# Patient Record
Sex: MALE | Race: WHITE
[De-identification: names, ages, dates, MRNs, and addresses within clinical notes are randomized per-mention and may not be internally consistent; named-entity substitution may affect disease eponyms.]

---

## 2019-11-29 ENCOUNTER — HOSPITAL ENCOUNTER (EMERGENCY)
Dept: HOSPITAL 74 - JER | Age: 59
LOS: 1 days | Discharge: HOME | End: 2019-11-30
Payer: COMMERCIAL

## 2019-11-29 VITALS — BODY MASS INDEX: 26.6 KG/M2

## 2019-11-29 VITALS — TEMPERATURE: 98.2 F

## 2019-11-29 DIAGNOSIS — Z79.4: ICD-10-CM

## 2019-11-29 DIAGNOSIS — E10.9: ICD-10-CM

## 2019-11-29 DIAGNOSIS — R51: Primary | ICD-10-CM

## 2019-11-29 LAB
ALBUMIN SERPL-MCNC: 4.1 G/DL (ref 3.4–5)
ALP SERPL-CCNC: 102 U/L (ref 45–117)
ALT SERPL-CCNC: 25 U/L (ref 13–61)
ANION GAP SERPL CALC-SCNC: 9 MMOL/L (ref 8–16)
AST SERPL-CCNC: 12 U/L (ref 15–37)
BILIRUB SERPL-MCNC: 0.4 MG/DL (ref 0.2–1)
BUN SERPL-MCNC: 12 MG/DL (ref 7–18)
CALCIUM SERPL-MCNC: 9.6 MG/DL (ref 8.5–10.1)
CHLORIDE SERPL-SCNC: 103 MMOL/L (ref 98–107)
CO2 SERPL-SCNC: 28 MMOL/L (ref 21–32)
CREAT SERPL-MCNC: 0.9 MG/DL (ref 0.55–1.3)
DEPRECATED RDW RBC AUTO: 15 % (ref 11.9–15.9)
GLUCOSE SERPL-MCNC: 273 MG/DL (ref 74–106)
HCT VFR BLD CALC: 38 % (ref 35.4–49)
HGB BLD-MCNC: 12.8 GM/DL (ref 11.7–16.9)
INR BLD: 0.97 (ref 0.83–1.09)
MCH RBC QN AUTO: 28.1 PG (ref 25.7–33.7)
MCHC RBC AUTO-ENTMCNC: 33.7 G/DL (ref 32–35.9)
MCV RBC: 83.2 FL (ref 80–96)
PLATELET # BLD AUTO: 264 K/MM3 (ref 134–434)
PMV BLD: 8.3 FL (ref 7.5–11.1)
POTASSIUM SERPLBLD-SCNC: 4.3 MMOL/L (ref 3.5–5.1)
PROT SERPL-MCNC: 7.5 G/DL (ref 6.4–8.2)
PT PNL PPP: 11.4 SEC (ref 9.7–13)
RBC # BLD AUTO: 4.57 M/MM3 (ref 4–5.6)
SODIUM SERPL-SCNC: 140 MMOL/L (ref 136–145)
WBC # BLD AUTO: 11 K/MM3 (ref 4–10)

## 2019-11-29 PROCEDURE — 3E033NZ INTRODUCTION OF ANALGESICS, HYPNOTICS, SEDATIVES INTO PERIPHERAL VEIN, PERCUTANEOUS APPROACH: ICD-10-PCS | Performed by: EMERGENCY MEDICINE

## 2019-11-29 PROCEDURE — 3E033GC INTRODUCTION OF OTHER THERAPEUTIC SUBSTANCE INTO PERIPHERAL VEIN, PERCUTANEOUS APPROACH: ICD-10-PCS | Performed by: EMERGENCY MEDICINE

## 2019-11-29 NOTE — PDOC
History of Present Illness





<Alison Slade - Last Filed: 11/29/19 22:59>





- General


History Source: Patient


Exam Limitations: No Limitations





- History of Present Illness


Initial Comments: 





11/29/19 22:16


60 yo male pmh DM presents to the ED with 1 day of HA and resolved non 

exertional CP. Pt states the HA started early this morning, described as 

constant and pounding without radiation. Pt tried advil without any relief. Pt 

admits to hx or migraines years ago and states it is of the same quality and 

intensity as in the past. CP resolved prior to evaluation, described as non 

exertional, located on the left side, denies radiation, denies fhx heart disease

, denies smoking, deies SOB. Denies neck pain/stiffness, F/C/N/V, changes in 

vision, weakness or sensory changes on 1 side, confusion or balance concerns.





<Kodak Bonds - Last Filed: 11/29/19 23:11>





- General


Chief Complaint: Headache


Stated Complaint: HEADACHE


Time Seen by Provider: 11/29/19 21:19





Past History





<Alison Slade - Last Filed: 11/29/19 22:59>





- Past Medical History


COPD: No


Diabetes: Yes (IDDM)





- Psycho Social/Smoking Cessation Hx


Smoking History: Former smoker


Have you smoked in the past 12 months: No


Information on smoking cessation initiated: No


Hx Alcohol Use: No


Drug/Substance Use Hx: No





<Kodak Bonds - Last Filed: 11/29/19 23:11>





- Past Medical History


Allergies/Adverse Reactions: 


 Allergies











Allergy/AdvReac Type Severity Reaction Status Date / Time


 


No Known Drug Allergies Allergy   Verified 09/27/16 07:56











Home Medications: 


Ambulatory Orders





Insulin Aspart [Novolog] 35 unit SQ BID 11/17/15 


Aspirin [Aspirin EC] 81 mg PO DAILY #30 tablet. 11/19/15 


metFORMIN HCL [Glucophage -] 500 mg PO BID #60 tablet 11/19/15 


Cephalexin [Keflex] 500 mg PO TID #21 capsule 09/27/16 


Ibuprofen 1 tab PO TID PRN #30 tablet 09/27/16 











**Review of Systems





- Review of Systems


Constitutional: Yes: Chills, Fever


HEENTM: No: Blurred Vision, Double Vision


Respiratory: No: Shortness of Breath


Cardiac (ROS): No: Chest Pain (resolved), Edema


ABD/GI: No: Constipated, Diarrhea, Nausea, Vomiting


: No: Burning, Dysuria, Flank Pain, Hematuria


Musculoskeletal: No: Back Pain


Neurological: Yes: Headache.  No: Numbness, Paresthesia, Weakness, Unsteady Gait

, Ataxia





<Kodak Bonds - Last Filed: 11/29/19 23:11>





*Physical Exam





- Vital Signs


 Last Vital Signs











Temp Pulse Resp BP Pulse Ox


 


 98.2 F   72   19   165/61   98 


 


 11/29/19 19:33  11/29/19 19:33  11/29/19 19:33  11/29/19 19:33  11/29/19 19:33














<Alison Slade - Last Filed: 11/29/19 22:59>





- Vital Signs


 Last Vital Signs











Temp Pulse Resp BP Pulse Ox


 


 98.2 F   72   19   165/61   98 


 


 11/29/19 19:33  11/29/19 19:33  11/29/19 19:33  11/29/19 19:33  11/29/19 19:33














- Physical Exam


General Appearance: Yes: Nourished, Appropriately Dressed.  No: Apparent 

Distress


HEENT: positive: EOMI, AARON


Neck: positive: Supple.  negative: Carotid bruit


Respiratory/Chest: positive: Lungs Clear, Normal Breath Sounds.  negative: 

Respiratory Distress, Accessory Muscle Use


Cardiovascular: positive: Regular Rhythm, Regular Rate, S1, S2.  negative: Edema

, JVD, Murmur


Vascular Pulses: Dorsalis-Pedis (R): 4+, Doralis-Pedis (L): 4+


Gastrointestinal/Abdominal: positive: Flat, Soft.  negative: Protuberent, 

Distended, Guarding, Rebound, Tenderness


Musculoskeletal: negative: CVA Tenderness


Extremity: positive: Normal Capillary Refill, Normal Inspection, Normal Range 

of Motion


Integumentary: positive: Normal Color, Dry, Warm


Neurologic: positive: CNs II-XII NML intact, Fully Oriented, Alert, Normal Mood/

Affect, Normal Response, Motor Strength 5/5.  negative: Facial Droop, Numbness, 

Sensory Deficit, Confused, Disoriented





<Kodak Bonds - Last Filed: 11/29/19 23:11>





ED Treatment Course





- LABORATORY


CBC & Chemistry Diagram: 


 11/29/19 22:00





 11/29/19 22:00





- ADDITIONAL ORDERS


Additional order review: 


 Laboratory  Results











  11/29/19 11/29/19





  22:00 22:00


 


PT with INR  11.40 


 


INR  0.97 


 


Sodium   140


 


Potassium   4.3


 


Chloride   103


 


Carbon Dioxide   28


 


Anion Gap   9


 


BUN   12.0


 


Creatinine   0.9


 


Est GFR (CKD-EPI)AfAm   108.73


 


Est GFR (CKD-EPI)NonAf   93.82


 


Random Glucose   273 H


 


Calcium   9.6


 


Total Bilirubin   0.4


 


AST   12 L


 


ALT   25


 


Alkaline Phosphatase   102


 


Creatine Kinase   84


 


Troponin I   < 0.02


 


Total Protein   7.5


 


Albumin   4.1








 











  11/29/19





  22:00


 


RBC  4.57


 


MCV  83.2


 


MCHC  33.7


 


RDW  15.0


 


MPV  8.3














- Medications


Given in the ED: 


ED Medications














Discontinued Medications














Generic Name Dose Route Start Last Admin





  Trade Name Freq  PRN Reason Stop Dose Admin


 


Acetaminophen  1,000 mg  11/29/19 21:30  11/29/19 22:15





  Ofirmev Injection -  IVPB  11/29/19 21:31  1,000 mg





  ONCE ONE   Administration





     





     





     





     


 


Sodium Chloride  1,000 mls @ 1,000 mls/hr  11/29/19 21:29  11/29/19 22:15





  Normal Saline -  IV  11/29/19 22:28  1,000 mls/hr





  ASDIR STA   Administration





     





     





     





     


 


Metoclopramide HCl  10 mg  11/29/19 21:30  11/29/19 22:30





  Reglan Injection -  IVPUSH  11/29/19 21:31  10 mg





  ONCE ONE   Administration





     





     





     





     














<Alison Slade - Last Filed: 11/29/19 22:59>





- LABORATORY


CBC & Chemistry Diagram: 


 11/29/19 22:00





 11/29/19 22:00





- RADIOLOGY


Radiology Studies Ordered: 














 Category Date Time Status


 


 HEAD CT WITHOUT CONTRAST [CT] Stat CT Scan  11/29/19 21:27 Ordered


 


 CHEST X-RAY PORTABLE* [RAD] Stat Radiology  11/29/19 21:27 Ordered














- Medications


Given in the ED: 


ED Medications














Discontinued Medications














Generic Name Dose Route Start Last Admin





  Trade Name Freq  PRN Reason Stop Dose Admin


 


Acetaminophen  1,000 mg  11/29/19 21:30  11/29/19 22:15





  Ofirmev Injection -  IVPB  11/29/19 21:31  1,000 mg





  ONCE ONE   Administration





     





     





     





     














<Kodak Bonds - Last Filed: 11/29/19 23:11>





Medical Decision Making





- Medical Decision Making





11/29/19 22:33


60 yo male pmh DM presents to the ED with 1 day of HA and resolved non 

exertional CP. Pt states the HA started early this morning, described as 

constant and pounding without radiation. Pt tried advil without any relief. Pt 

admits to hx or migraines years ago and states it is of the same quality and 

intensity as in the past. CP resolved prior to evaluation, described as non 

exertional, located on the left side, denies radiation, denies fhx heart disease

, denies smoking, deies SOB. Denies neck pain/stiffness, F/C/N/V, changes in 

vision, weakness or sensory changes on 1 side, confusion or balance concerns.





Vitals show elevated BP 160s systolic, will reassess after pain controlled 





given fluids, tylenol and reglan 





Due to presentation with CP, will trops, ekg and CXR along with head CT for HA 

to r/o bleed


11/29/19 23:09





head ct wnl 





Medications help resolve pt HA





Trops neg, labs wnl. Elevated blood sugar, pt type 1 diabetic states he at just 

before coming to the ED. 














<Kodak Bonds - Last Filed: 11/29/19 23:11>





Discharge





<Alison Slade - Last Filed: 11/29/19 22:59>





- Discharge Information


Problems reviewed: Yes





- Admission


No





<Kodak Bonds - Last Filed: 11/29/19 23:11>





- Discharge Information


Clinical Impression/Diagnosis: 


 Headache





Condition: Stable


Disposition: HOME





- Follow up/Referral


Referrals: 


Kyle Cisneros MD [Primary Care Provider] - 


Clay Lewis MD [Staff Physician] - 





- Patient Discharge Instructions


Patient Printed Discharge Instructions:  Eating a Diet Rich in Fruits and 

Vegetables, DI for Migraine


Additional Instructions: 


Please see your Primary Doctor and Cardiologist within the next 1 week. Take 

your home dosed medications as prescribed. Take over the counter Nsaid 

medication such as Motrin for headaches. If the headache returns, make an 

appointment with the Neurologist referred to you. Return to the ER for new or 

concerning symptoms including but not limited to: severe headaches, changes in 

vision, fevers, neck pain, inability to eat or drink.





- Post Discharge Activity

## 2019-11-29 NOTE — PDOC
Attending Attestation





- Resident


Resident Name: Kodak Bonds





- ED Attending Attestation


I have performed the following: I have examined & evaluated the patient, The 

case was reviewed & discussed with the resident, I agree w/resident's findings 

& plan





- HPI


HPI: 





11/29/19 22:09


Pt comes with





- Physicial Exam


PE: 





11/29/19 22:22


Agree with resident exam





- Medical Decision Making





11/29/19 22:21


Pt will get a head CT scan for headache, as last bad headache was years ago. 


He took advil with no relief.





11/29/19 22:34


head CT normal


CXR normal


11/29/19 22:38


CBC and INR normal


Chem still pending


11/29/19 22:55


Labs normal


Glucose 273





11/29/19 23:09


CT head is normal; CXR normal


Pt feels well; labs normal


Pt ate prior to arrival.  We will give him diet info.





**Heart Score/ECG Review





- ECG Intrepretation


Rhythm: Regular Rhythm





- Axis


Axis: Normal





- P and HI


Delta Wave(s) Present: No


WPW: No





- QRS


Poor R Wave Progression: No


Q Wave Present: No





- ST and T


Early Repolarization: No


Non Specific ST-T Wave changes: Yes


Flattened T Waves: Yes


Prolonged Q-T Interval: No





- ECG Impressions


Normal ECG: Yes


Non-specific ST Elevation: No


Ischemic Changes: Yes (inferior)


Bradycardia: No

## 2019-11-30 VITALS — SYSTOLIC BLOOD PRESSURE: 154 MMHG | DIASTOLIC BLOOD PRESSURE: 58 MMHG | HEART RATE: 66 BPM

## 2019-12-02 NOTE — EKG
Test Reason : 

Blood Pressure : ***/*** mmHG

Vent. Rate : 065 BPM     Atrial Rate : 065 BPM

   P-R Int : 168 ms          QRS Dur : 092 ms

    QT Int : 398 ms       P-R-T Axes : 029 002 -03 degrees

   QTc Int : 413 ms

 

NORMAL SINUS RHYTHM

MINIMAL VOLTAGE CRITERIA FOR LVH, MAY BE NORMAL VARIANT

NONSPECIFIC T WAVE ABNORMALITY

ABNORMAL ECG

WHEN COMPARED WITH ECG OF 27-SEP-2016 06:10,

NO SIGNIFICANT CHANGE WAS FOUND

Confirmed by ZULY CONTI, RICK ([id number]) on 12/2/2019 11:36:05 AM

 

Referred By:             Confirmed By:RICK CARUSO MD